# Patient Record
Sex: MALE | Race: BLACK OR AFRICAN AMERICAN | NOT HISPANIC OR LATINO | URBAN - METROPOLITAN AREA
[De-identification: names, ages, dates, MRNs, and addresses within clinical notes are randomized per-mention and may not be internally consistent; named-entity substitution may affect disease eponyms.]

---

## 2017-05-31 ENCOUNTER — APPOINTMENT (OUTPATIENT)
Dept: OPTOMETRY | Facility: CLINIC | Age: 22
End: 2017-05-31
Payer: COMMERCIAL

## 2017-05-31 PROCEDURE — 92340 FIT SPECTACLES MONOFOCAL: CPT | Performed by: OPTOMETRIST

## 2019-02-13 ENCOUNTER — OFFICE VISIT (OUTPATIENT)
Dept: OPTOMETRY | Facility: CLINIC | Age: 24
End: 2019-02-13
Payer: COMMERCIAL

## 2019-02-13 ENCOUNTER — APPOINTMENT (OUTPATIENT)
Dept: OPTOMETRY | Facility: CLINIC | Age: 24
End: 2019-02-13
Payer: COMMERCIAL

## 2019-02-13 DIAGNOSIS — H52.13 MYOPIA OF BOTH EYES: Primary | ICD-10-CM

## 2019-02-13 DIAGNOSIS — H52.223 REGULAR ASTIGMATISM OF BOTH EYES: ICD-10-CM

## 2019-02-13 PROCEDURE — 92014 COMPRE OPH EXAM EST PT 1/>: CPT | Mod: GA | Performed by: OPTOMETRIST

## 2019-02-13 PROCEDURE — 92015 DETERMINE REFRACTIVE STATE: CPT | Performed by: OPTOMETRIST

## 2019-02-13 PROCEDURE — 92310 CONTACT LENS FITTING OU: CPT | Performed by: OPTOMETRIST

## 2019-02-13 PROCEDURE — 92340 FIT SPECTACLES MONOFOCAL: CPT | Performed by: OPTOMETRIST

## 2019-02-13 ASSESSMENT — VISUAL ACUITY
OD_SC: 20/500
OD_CC: 20/30-1
OS_CC: 20/30-1
OD_CC: 20/20
OD_CC+: -2
CORRECTION_TYPE: GLASSES
METHOD: SNELLEN - LINEAR
OS_SC: 20/500
OS_CC: 20/20

## 2019-02-13 ASSESSMENT — TONOMETRY
OD_IOP_MMHG: 21
OS_IOP_MMHG: 19
IOP_METHOD: APPLANATION

## 2019-02-13 ASSESSMENT — REFRACTION_MANIFEST
OS_CYLINDER: +1.00
OD_CYLINDER: +0.50
OS_AXIS: 094
OD_SPHERE: -4.25
OS_SPHERE: -4.50
OD_AXIS: 050
OD_SPHERE: -4.25
OD_AXIS: 050
OS_SPHERE: -4.50
OD_CYLINDER: +0.75
METHOD_AUTOREFRACTION: 1
OS_CYLINDER: +0.75
OS_AXIS: 093

## 2019-02-13 ASSESSMENT — REFRACTION_CURRENTRX
OD_BRAND: ALCON AIR OPTIX AQUA BC 8.6, D 14.2
OD_SPHERE: -4.00
OS_BRAND: ALCON AIR OPTIX AQUA BC 8.6, D 14.2
OS_SPHERE: -4.00

## 2019-02-13 ASSESSMENT — KERATOMETRY
OD_K1POWER_DIOPTERS: 42.75
OS_AXISANGLE2_DEGREES: 180
OD_AXISANGLE2_DEGREES: 166
OD_K2POWER_DIOPTERS: 44.00
OS_K2POWER_DIOPTERS: 43.50
OD_AXISANGLE_DEGREES: 076
OS_K1POWER_DIOPTERS: 42.00
OS_AXISANGLE_DEGREES: 090
METHOD_AUTO_MANUAL: AUTOMATED

## 2019-02-13 ASSESSMENT — EXTERNAL EXAM - RIGHT EYE: OD_EXAM: NORMAL

## 2019-02-13 ASSESSMENT — SLIT LAMP EXAM - LIDS
COMMENTS: NORMAL
COMMENTS: NORMAL

## 2019-02-13 ASSESSMENT — REFRACTION_WEARINGRX
OS_CYLINDER: +0.75
OD_AXIS: 057
OD_CYLINDER: +0.25
SPECS_TYPE: SVL
OD_SPHERE: -4.00
OS_SPHERE: -4.25
OS_AXIS: 092

## 2019-02-13 ASSESSMENT — CUP TO DISC RATIO
OS_RATIO: 0.2
OD_RATIO: 0.2

## 2019-02-13 ASSESSMENT — CONF VISUAL FIELD
METHOD: COUNTING FINGERS
OD_NORMAL: 1
OS_NORMAL: 1

## 2019-02-13 ASSESSMENT — EXTERNAL EXAM - LEFT EYE: OS_EXAM: NORMAL

## 2019-02-13 NOTE — PATIENT INSTRUCTIONS
Please return for the contact lens insertion and removal class. We will need to see you 1 to 2 weeks after the class wearing the contact lenses.    Your eyes may be blurry at near and sensitive to light for several hours from the dilating drops.      Optometry Providers       Clinic Locations                                 Telephone Number   Dr. Esme Claros [place]   [facility] [place]   [place] [phone]     [place] [facility] Hours:                [place] [facility] Hours:       [place] [facility] Hours:   [address]   [address] [facility] [address]     [address] [place] [address]   [place] [address]    [place] [address]  Phone: [phone]                    Phone: [phone]     Phone: [phone]                      Monday 8:00-7:00                          Monday 8:00-7:00                          Monday 8:00-7:00              Tuesday 8:00-6:00                          Tuesday 8:00-7:00                          Tuesday 8:00-7:00              Wednesday 8:00-6:00                  Wednesday 8:00-7:00                   Wednesday 8:00-7:00      Thursday 8:00-6:00                        Thursday 8:00-7:00                         Thursday 8:00-7:00            Friday 8:00-5:00                              Friday 8:00-5:00                              Friday 8:00-5:00    [facility] Hours:   [address] [facility] [place] [address]  [phone]    Monday 8:00-7:00  Tuesday 8:00-7:00  Wednesday 8:00-7:00  Thursday 8:00-7:00  Friday 8:00-5:00  Please log on to [email or web address] to order your contact lenses.  The link is found on the Eye Care and Vision Services page.  As always, Thank you for trusting us with your health care needs!]\

## 2019-02-13 NOTE — PROGRESS NOTES
Chief Complaint   Patient presents with     Annual Eye Exam     Waiver signed and ff pd     New Eval For Contact Lens     Accompanied by girlfriend  Previous contact lens wearer? No  Comfort of contact lenses :NA   Satisfied with current lenses: NA        Last Eye Exam: 2016  Dilated Previously: Yes    What are you currently using to see?  glasses    Distance Vision Acuity: Satisfied with vision    Near Vision Acuity: Satisfied with vision while reading  with glasses    Eye Comfort: good  Do you use eye drops? : No  Occupation or Hobbies: [place] in [employer]       Medical, surgical and family histories reviewed and updated 2/13/2019.       OBJECTIVE: See Ophthalmology exam    ASSESSMENT:    ICD-10-CM    1. Myopia of both eyes H52.13 EYE EXAM (SIMPLE-NONBILLABLE)     REFRACTION     C CONTACT LENS FITTING,BILAT (NEW)   2. Regular astigmatism of both eyes H52.223 EYE EXAM (SIMPLE-NONBILLABLE)     REFRACTION     C CONTACT LENS FITTING,BILAT (NEW)      PLAN:     Patient Instructions     Please return for the contact lens insertion and removal class. We will need to see you 1 to 2 weeks after the class wearing the contact lenses.    Your eyes may be blurry at near and sensitive to light for several hours from the dilating drops.      Optometry Providers       Clinic Locations                                 Telephone Number   Dr. Esme Claros [place]   [place]    [place]   [place] [facility]   [place] [phone]     [place] [facility] Hours:                [place] [facility] Hours:       [place] [facility] Hours:   [address] [facility] [address]   [address]     [address] [place] [address]   [place] [address]    [place] [address]  Phone: [phone]                    Phone: [phone]     Phone: [phone]                      Monday 8:00-7:00                          Monday  8:00-7:00                          Monday 8:00-7:00              Tuesday 8:00-6:00                          Tuesday 8:00-7:00                          Tuesday 8:00-7:00              Wednesday 8:00-6:00                  Wednesday 8:00-7:00                   Wednesday 8:00-7:00      Thursday 8:00-6:00                        Thursday 8:00-7:00                         Thursday 8:00-7:00            Friday 8:00-5:00 Friday 8:00-5:00                              Friday 8:00-5:00    [facility] Hours:   [address] [place] [address]  [phone]    Monday 8:00-7:00  Tuesday 8:00-7:00  Wednesday 8:00-7:00  Thursday 8:00-7:00  Friday 8:00-5:00  Please log on to [email or web address] to order your contact lenses.  The link is found on the Eye Care and Vision Services page.  As always, Thank you for trusting us with your health care needs!]\

## 2019-02-13 NOTE — LETTER
2/13/2019         RE: Alexey Monsalve  [address]  [place] [address]        Dear Colleague,    Thank you for referring your patient, Alexey Monsalve, to the [facility]. Please see a copy of my visit note below.    Chief Complaint   Patient presents with     Annual Eye Exam     Waiver signed and ff pd     New Eval For Contact Lens     Accompanied by girlfriend  Previous contact lens wearer? No  Comfort of contact lenses :NA   Satisfied with current lenses: NA        Last Eye Exam: 2016  Dilated Previously: Yes    What are you currently using to see?  glasses    Distance Vision Acuity: Satisfied with vision    Near Vision Acuity: Satisfied with vision while reading  with glasses    Eye Comfort: good  Do you use eye drops? : No  Occupation or Hobbies: [place] in [employer]       Medical, surgical and family histories reviewed and updated 2/13/2019.       OBJECTIVE: See Ophthalmology exam    ASSESSMENT:    ICD-10-CM    1. Myopia of both eyes H52.13 EYE EXAM (SIMPLE-NONBILLABLE)     REFRACTION     C CONTACT LENS FITTING,BILAT (NEW)   2. Regular astigmatism of both eyes H52.223 EYE EXAM (SIMPLE-NONBILLABLE)     REFRACTION     C CONTACT LENS FITTING,BILAT (NEW)      PLAN:     Patient Instructions     Please return for the contact lens insertion and removal class. We will need to see you 1 to 2 weeks after the class wearing the contact lenses.    Your eyes may be blurry at near and sensitive to light for several hours from the dilating drops.      Optometry Providers       Clinic Locations                                 Telephone Number   Dr. Es[facility] Claros [facility] [place]   [place] [phone]     [place] [facility] Hours:                [place] [facility] Hours:       [place] [facility] Hours:   [address]   [address]     [address]  [place] [address]   [place] [address]    [place] [address]  Phone: [phone]                    Phone: [phone]     Phone: [phone]                      Monday 8:00-7:00                          Monday 8:00-7:00                          Monday 8:00-7:00              Tuesday 8:00-6:00                          Tuesday 8:00-7:00                          Tuesday 8:00-7:00              Wednesday 8:00-6:00                  Wednesday 8:00-7:00                   Wednesday 8:00-7:00      Thursday 8:00-6:00                        Thursday 8:00-7:00                         Thursday 8:00-7:00            Friday 8:00-5:00                              Friday 8:00-5:00                              Friday 8:00-5:00    [facility] Hours:   [address] [place] [address]  [phone]    Monday 8:00-7:00  Tuesday 8:00-7:00  Wednesday 8:00-7:00  Thursday 8:00-7:00  Friday 8:00-5:00  Please log on to [email or web address] to order your contact lenses.  The link is found on the Eye Care and Vision Services page.  As always, Thank you for trusting us with your health care needs!]\                       Again, thank you for allowing me to participate in the care of your patient.        Sincerely,        Esme Mendosa OD

## 2019-02-14 ENCOUNTER — OFFICE VISIT (OUTPATIENT)
Dept: OPTOMETRY | Facility: CLINIC | Age: 24
End: 2019-02-14
Payer: COMMERCIAL

## 2019-02-14 DIAGNOSIS — H52.203 MYOPIA OF BOTH EYES WITH ASTIGMATISM: Primary | ICD-10-CM

## 2019-02-14 DIAGNOSIS — H52.13 MYOPIA OF BOTH EYES WITH ASTIGMATISM: Primary | ICD-10-CM

## 2019-02-14 PROCEDURE — 99207 ZZC NO BILLABLE SERVICE THIS VISIT: CPT | Performed by: OPTOMETRIST

## 2019-02-14 ASSESSMENT — REFRACTION_CURRENTRX
OD_BRAND: ALCON AIR OPTIX AQUA BC 8.6, D 14.2
OS_BRAND: ALCON AIR OPTIX AQUA BC 8.6, D 14.2
OS_SPHERE: -4.00
OD_SPHERE: -4.00

## 2019-02-14 ASSESSMENT — VISUAL ACUITY
CORRECTION_TYPE: CONTACTS
OD_CC: 20/25
OS_CC: 20/25
METHOD: SNELLEN - LINEAR
OS_CC+: -1

## 2019-02-14 NOTE — LETTER
2/14/2019         RE: Alexey Monsalve  [address]  [place] [address]        Dear Colleague,    Thank you for referring your patient, Alexey Monsalve, to the [facility]. Please see a copy of my visit note below.    Alexey Monsalve          1995     [id number]     Pt had class which took 45 minutes.  Pt inserted and removed contact in both eyes.      Procedure      Wearing Schedule 1st Week    Wash hands with oil/perfume free soap.   Day 1    4 hours  Cleanse and disinfect contacts daily.    Day 2    6 hours  Clean_________________________   Day 3    8 hours  Rinse_________________________   Day 4    8 hours  Disinfect______________________    Day 5    10 hours  Rewet________________________    Day 6    10 hours          Day 7    10 hours  Use only eye drops made for contact lenses.  Return in 1-2 weeks for contact check appointment-Come in wearing your contacts.    Replacement Schedule  Replace in    Sleeping in contacts is NOT recommended.    Sleeping contact lenses increases the risk of contact lens related problems such as but not limited to corneal ulceration,  infiltrates, conjunctivitis, and neovascularization.  Not all contacts are approved for overnight wear.  Make sure you are using your contacts as they are intended.    Congratulations! You have been fitted with contact lenses.  Please follow these simple steps to insure successful contact lens wear.  1.  If your lenses are uncomfortable, cause redness, pain, or blurry vision discontinue wear immediately and call [facility] for an appointment at [phone].  2. Return to Optometry contact lens checks and yearly eye exams.  3. Never wear lenses longer than the prescribed time.  Maximum wearing time of 12  hours a day.  4. Use only prescribed solutions because mixing brands or types may result in problems.  5. Never wear a torn, discolored, scratched, or chipped lens for any reason.  6. Always follow your doctor and  's recommendations.  7. Use only water-soluble cosmetics, especially mascara.  8. Always have a current pair of eyeglasses available.  9. Do not wear contacts while soaking in a hot tub or while swimming.  10. Only FDA approved extended wear contacts are suitable for sleeping.    I have read and understand all the enclosed material and have been instructed on insertion, removal, and care of my contact lenses.    X_______________________________________________            Again, thank you for allowing me to participate in the care of your patient.        Sincerely,        Esme Mendosa OD

## 2019-02-14 NOTE — PROGRESS NOTES
Alexey MERAZ [place]          1995     [id number]     Pt had class which took 45 minutes.  Pt inserted and removed contact in both eyes.      Procedure      Wearing Schedule 1st Week    Wash hands with oil/perfume free soap.   Day 1    4 hours  Cleanse and disinfect contacts daily.    Day 2    6 hours  Clean_________________________   Day 3    8 hours  Rinse_________________________   Day 4    8 hours  Disinfect______________________    Day 5    10 hours  Rewet________________________    Day 6    10 hours          Day 7    10 hours  Use only eye drops made for contact lenses.  Return in 1-2 weeks for contact check appointment-Come in wearing your contacts.    Replacement Schedule  Replace in    Sleeping in contacts is NOT recommended.    Sleeping contact lenses increases the risk of contact lens related problems such as but not limited to corneal ulceration,  infiltrates, conjunctivitis, and neovascularization.  Not all contacts are approved for overnight wear.  Make sure you are using your contacts as they are intended.    Congratulations! You have been fitted with contact lenses.  Please follow these simple steps to insure successful contact lens wear.  1.  If your lenses are uncomfortable, cause redness, pain, or blurry vision discontinue wear immediately and call [facility] for an appointment at [phone].  2. Return to Optometry contact lens checks and yearly eye exams.  3. Never wear lenses longer than the prescribed time.  Maximum wearing time of 12  hours a day.  4. Use only prescribed solutions because mixing brands or types may result in problems.  5. Never wear a torn, discolored, scratched, or chipped lens for any reason.  6. Always follow your doctor and 's recommendations.  7. Use only water-soluble cosmetics, especially mascara.  8. Always have a current pair of eyeglasses available.  9. Do not wear contacts while soaking in a hot tub or while swimming.  10. Only FDA  approved extended wear contacts are suitable for sleeping.    I have read and understand all the enclosed material and have been instructed on insertion, removal, and care of my contact lenses.    X_______________________________________________

## 2019-02-28 ENCOUNTER — OFFICE VISIT (OUTPATIENT)
Dept: OPTOMETRY | Facility: CLINIC | Age: 24
End: 2019-02-28
Payer: COMMERCIAL

## 2019-02-28 DIAGNOSIS — H52.13 MYOPIA OF BOTH EYES: Primary | ICD-10-CM

## 2019-02-28 PROCEDURE — 99207 ZZC NO BILLABLE SERVICE THIS VISIT: CPT | Performed by: OPTOMETRIST

## 2019-02-28 ASSESSMENT — VISUAL ACUITY
OD_CC+: -2
OD_CC: 20/20
CORRECTION_TYPE: CONTACTS
OS_CC: 20/20
METHOD: SNELLEN - LINEAR

## 2019-02-28 ASSESSMENT — REFRACTION_CURRENTRX
OS_BRAND: ALCON AIR OPTIX AQUA BC 8.6, D 14.2
OS_SPHERE: -4.00
OD_BRAND: ALCON AIR OPTIX AQUA BC 8.6, D 14.2
OD_SPHERE: -4.00

## 2019-02-28 NOTE — PATIENT INSTRUCTIONS
Good contact lens fit, comfort and vision in a new wearer. Return to clinic if any concerns and yearly for eye exams.      Optometry Providers       Clinic Locations                                 Telephone Number   Dr. Esme Claros [facility] and [place]   [place] [phone]     [place] [facility] Hours:                [place] [facility] Hours:       [place] [facility] Hours:   [address]   [address] [facility] [address]     [address] [place] [address]   [place] [address]    [place] [address]  Phone: [phone]                    Phone: [phone]     Phone: [phone]                      Monday 8:00-7:00                          Monday 8:00-7:00                          Monday 8:00-7:00              Tuesday 8:00-6:00                          Tuesday 8:00-7:00                          Tuesday 8:00-7:00              Wednesday 8:00-6:00                  Wednesday 8:00-7:00                   Wednesday 8:00-7:00      Thursday 8:00-6:00                        Thursday 8:00-7:00                         Thursday 8:00-7:00            Friday 8:00-5:00                              Friday 8:00-5:00                              Friday 8:00-5:00    [facility] Hours:   [address] [place] [address]  [phone]    Monday 8:00-7:00  Tuesday 8:00-7:00  Wednesday 8:00-7:00  Thursday 8:00-7:00  Friday 8:00-5:00  Please log on to [email or web address] to order your contact lenses.  The link is found on the Eye Care and Vision Services page.  As always, Thank you for trusting us with your health care needs!

## 2019-02-28 NOTE — PROGRESS NOTES
Chief Complaint   Patient presents with     Contact Lens Follow Up     Satisfied with contacts:  Yes    Good comfort:  Yes  Clear vision:     Yes    Aline Vincent  Optometric Tech            Medical, surgical and family histories reviewed and updated 2/28/2019.       OBJECTIVE: See Ophthalmology exam    ASSESSMENT:    ICD-10-CM    1. Myopia of both eyes H52.13       PLAN:    Patient Instructions     Good contact lens fit, comfort and vision in a new wearer. Return to clinic if any concerns and yearly for eye exams.      Optometry Providers       Clinic Locations                                 Telephone Number   [facility] [place]   [place] [phone]     [place] [facility] Hours:                [place] [facility] Hours:       [place] [facility] Hours:   [address]   [address] [facility]     [address] [facility] [place] [address]   [place] [address]    [place] [address]  Phone: [phone]                    Phone: [phone]     Phone: [phone]                      Monday 8:00-7:00                          Monday 8:00-7:00                          Monday 8:00-7:00              Tuesday 8:00-6:00                          Tuesday 8:00-7:00                          Tuesday 8:00-7:00              Wednesday 8:00-6:00                  Wednesday 8:00-7:00                   Wednesday 8:00-7:00      Thursday 8:00-6:00                        Thursday 8:00-7:00                         Thursday 8:00-7:00            Friday 8:00-5:00                              Friday 8:00-5:00                              Friday 8:00-5:00    [facility] Hours:   [address] [place] [address] [phone]    Monday 8:00-7:00  Tuesday 8:00-7:00  Wednesday 8:00-7:00  Thursday 8:00-7:00  Friday 8:00-5:00  Please log on to [email or web address] to order your contact lenses.  The link is  found on the Eye Care and Vision Services page.  As always, Thank you for trusting us with your health care needs!

## 2019-02-28 NOTE — LETTER
2/28/2019         RE: Alexey Monsalve  [address]  [place] [facility] [place] [address]        Dear Colleague,    Thank you for referring your patient, Alexey Monsalve, to the [facility]. Please see a copy of my visit note below.    Chief Complaint   Patient presents with     Contact Lens Follow Up     Satisfied with contacts:  Yes    Good comfort:  Yes  Clear vision:     Yes    Aline Vincent  Optometric Tech            Medical, surgical and family histories reviewed and updated 2/28/2019.       OBJECTIVE: See Ophthalmology exam    ASSESSMENT:    ICD-10-CM    1. Myopia of both eyes H52.13       PLAN:    Patient Instructions     Good contact lens fit, comfort and vision in a new wearer. Return to clinic if any concerns and yearly for eye exams.      Optometry Providers       Clinic Locations                                 Telephone Number   Dr. Esme Claros [facility] and [place]   [place] [phone]     [place] [facility] Hours:                [place] Optical Hours:       [place] [facility] Hours:   [address]   [address] [facility] [address]     [address] [place] [address]   [place] [address]    [place] [address]  Phone: [phone]                    Phone: [phone]     Phone: [phone]                      Monday 8:00-7:00                          Monday 8:00-7:00                          Monday 8:00-7:00              Tuesday 8:00-6:00                          Tuesday 8:00-7:00                          Tuesday 8:00-7:00              Wednesday 8:00-6:00                  Wednesday 8:00-7:00                   Wednesday 8:00-7:00      Thursday 8:00-6:00                        Thursday 8:00-7:00                         Thursday 8:00-7:00            Friday 8:00-5:00                              Friday 8:00-5:00                              Friday 8:00-5:00    [place]  Optical Hours:   [address] [address]  [phone]    Monday 8:00-7:00  Tuesday 8:00-7:00  Wednesday 8:00-7:00  Thursday 8:00-7:00  Friday 8:00-5:00  Please log on to [email or web address] to order your contact lenses.  The link is found on the Eye Care and Vision Services page.  As always, Thank you for trusting us with your health care needs!                       Again, thank you for allowing me to participate in the care of your patient.        Sincerely,        Esme Mendosa OD

## 2020-03-23 ENCOUNTER — TELEPHONE (OUTPATIENT)
Dept: FAMILY MEDICINE | Facility: CLINIC | Age: 25
End: 2020-03-23

## 2020-03-23 NOTE — TELEPHONE ENCOUNTER
Reason for Call:  Other Note    Detailed comments: Pt's mother is requesting a note be written for the pt to be off of work through 03/30/2020 due to asthma as well as other family at home with compromised health issues. She would like a call back to confirm. Thank you.    Phone Number Patient can be reached at: Cell number on file:    Telephone Information:   Mobile [phone]       Best Time: Any    Can we leave a detailed message on this number? YES    Call taken on 3/23/2020 at 10:36 AM by Susy Acosta

## 2020-03-23 NOTE — TELEPHONE ENCOUNTER
Routing to Dr Castro for Dr Beard. Please advise.  Munira Joy [facility]  2nd Floor  Primary Care

## 2020-03-23 NOTE — TELEPHONE ENCOUNTER
Disregard sending message to Dr Castro. I retrieved the message:  Patient has not been seen for 4 years. Patient is 24 and can not see the Pediatrician due to age. No consent to Communicate. Called and spoke to mom and explained the situation and she states that she will have the patient contact us.  Munira Joy [facility]  2nd Floor  Primary Care

## 2020-06-29 ENCOUNTER — TRANSFERRED RECORDS (OUTPATIENT)
Dept: HEALTH INFORMATION MANAGEMENT | Facility: CLINIC | Age: 25
End: 2020-06-29

## 2022-06-09 ENCOUNTER — OFFICE VISIT (OUTPATIENT)
Dept: OPTOMETRY | Facility: CLINIC | Age: 27
End: 2022-06-09

## 2022-06-09 DIAGNOSIS — Z53.9 ERRONEOUS ENCOUNTER--DISREGARD: Primary | ICD-10-CM

## 2022-06-09 ASSESSMENT — REFRACTION_MANIFEST
OS_SPHERE: -3.75
METHOD_AUTOREFRACTION: 1
OS_CYLINDER: +0.50
OD_AXIS: 057
OS_AXIS: 102
OD_CYLINDER: +0.75
OD_SPHERE: -4.00

## 2022-06-09 ASSESSMENT — VISUAL ACUITY
CORRECTION_TYPE: GLASSES
OD_CC: 20/20
OS_CC: 20/20
OD_CC+: -1
OD_CC: 20/20
OS_SC: 20/250
OS_CC: 20/20
METHOD: SNELLEN - LINEAR
OD_SC: 20/400

## 2022-06-09 ASSESSMENT — REFRACTION_WEARINGRX
OS_AXIS: 093
OD_SPHERE: -4.25
OD_AXIS: 050
SPECS_TYPE: SVL
OS_SPHERE: -4.50
OD_CYLINDER: +0.50
OS_CYLINDER: +0.75

## 2022-06-09 ASSESSMENT — CONF VISUAL FIELD
OD_NORMAL: 1
OS_NORMAL: 1

## 2022-06-09 ASSESSMENT — KERATOMETRY
OD_K2POWER_DIOPTERS: 44.25
OS_K1POWER_DIOPTERS: 42.00
OS_AXISANGLE2_DEGREES: 003
OD_AXISANGLE2_DEGREES: 167
OS_K2POWER_DIOPTERS: 43.50
OD_AXISANGLE_DEGREES: 077
OS_AXISANGLE_DEGREES: 093
OD_K1POWER_DIOPTERS: 42.75

## 2022-06-09 NOTE — PROGRESS NOTES
Patient was told he does not have active insurance before provider entered the room.    Drew [place] OD

## 2022-06-09 NOTE — PROGRESS NOTES
Chief Complaint   Patient presents with     Annual Eye Exam     More contacts fit fee ok $75      Accompanied by self  Previous contact lens wearer? Yes: air optix aqua  Comfort of contact lenses :good  Satisfied with current lenses: Yes,but last box made eyes sting         Last Eye Exam: 2-  Dilated Previously: Yes    What are you currently using to see?  glasses and contacts    Distance Vision Acuity: Satisfied with vision    Near Vision Acuity: Satisfied with vision while reading  with glasses    Eye Comfort: good  Do you use eye drops? : No  Occupation or Hobbies: [employer]     Medical, surgical and family histories reviewed and updated 6/9/2022.       OBJECTIVE: See Ophthalmology exam    ASSESSMENT:  No diagnosis found.   PLAN:     There are no Patient Instructions on file for this visit.

## 2022-06-09 NOTE — LETTER
6/9/2022         RE: Alexey Monsalve  [address]  [place] [place] [address]        Dear Colleague,    Thank you for referring your patient, Alexey Monsalve, to the [facility]. Please see a copy of my visit note below.    Patient was told he does not have active insurance before provider entered the room.    Drew [place] OD        Again, thank you for allowing me to participate in the care of your patient.        Sincerely,        Drew Lancaster OD